# Patient Record
Sex: FEMALE | Race: WHITE
[De-identification: names, ages, dates, MRNs, and addresses within clinical notes are randomized per-mention and may not be internally consistent; named-entity substitution may affect disease eponyms.]

---

## 2020-01-20 ENCOUNTER — HOSPITAL ENCOUNTER (EMERGENCY)
Dept: HOSPITAL 4 - SED | Age: 85
LOS: 1 days | Discharge: ANOTHER HOSPITAL | End: 2020-01-21
Payer: MEDICARE

## 2020-01-20 VITALS — HEIGHT: 66 IN | WEIGHT: 92 LBS | BODY MASS INDEX: 14.79 KG/M2

## 2020-01-20 VITALS — SYSTOLIC BLOOD PRESSURE: 124 MMHG
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
## 2020-01-20 DIAGNOSIS — S72.001A: Primary | ICD-10-CM
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
## 2020-01-20 DIAGNOSIS — I48.91: ICD-10-CM

## 2020-01-20 DIAGNOSIS — F41.9: ICD-10-CM

## 2020-01-20 LAB
ALBUMIN SERPL BCP-MCNC: 2.9 G/DL (ref 3.4–4.8)
ALT SERPL W P-5'-P-CCNC: 63 U/L (ref 12–78)
ANION GAP SERPL CALCULATED.3IONS-SCNC: 5 MMOL/L (ref 5–15)
APPEARANCE UR: CLEAR
AST SERPL W P-5'-P-CCNC: 66 U/L (ref 10–37)
BASOPHILS # BLD AUTO: 0.1 K/UL (ref 0–0.2)
BASOPHILS NFR BLD AUTO: 0.8 % (ref 0–2)
BILIRUB SERPL-MCNC: 0.5 MG/DL (ref 0–1)
BILIRUB UR QL STRIP: NEGATIVE
BUN SERPL-MCNC: 21 MG/DL (ref 8–21)
CALCIUM SERPL-MCNC: 8.5 MG/DL (ref 8.4–11)
CHLORIDE SERPL-SCNC: 90 MMOL/L (ref 98–107)
COLOR UR: YELLOW
CREAT SERPL-MCNC: 0.66 MG/DL (ref 0.55–1.3)
EOSINOPHIL # BLD AUTO: 0 K/UL (ref 0–0.4)
EOSINOPHIL NFR BLD AUTO: 0.3 % (ref 0–4)
ERYTHROCYTE [DISTWIDTH] IN BLOOD BY AUTOMATED COUNT: 14.2 % (ref 9–15)
GFR SERPL CREATININE-BSD FRML MDRD: (no result) ML/MIN (ref 90–?)
GLUCOSE SERPL-MCNC: 116 MG/DL (ref 70–99)
GLUCOSE UR STRIP-MCNC: NEGATIVE MG/DL
HCT VFR BLD AUTO: 32.9 % (ref 36–48)
HGB BLD-MCNC: 11.1 G/DL (ref 12–16)
HGB UR QL STRIP: NEGATIVE
INR PPP: 1.1 (ref 0.8–1.2)
KETONES UR STRIP-MCNC: NEGATIVE MG/DL
LEUKOCYTE ESTERASE UR QL STRIP: NEGATIVE
LYMPHOCYTES # BLD AUTO: 0.3 K/UL (ref 1–5.5)
LYMPHOCYTES NFR BLD AUTO: 3.2 % (ref 20.5–51.5)
MCH RBC QN AUTO: 32 PG (ref 27–31)
MCHC RBC AUTO-ENTMCNC: 34 % (ref 32–36)
MCV RBC AUTO: 96 FL (ref 79–98)
MONOCYTES # BLD MANUAL: 1.7 K/UL (ref 0–1)
MONOCYTES # BLD MANUAL: 15.8 % (ref 1.7–9.3)
NEUTROPHILS # BLD AUTO: 8.4 K/UL (ref 1.8–7.7)
NEUTROPHILS NFR BLD AUTO: 79.9 % (ref 40–70)
NITRITE UR QL STRIP: NEGATIVE
PH UR STRIP: 6.5 [PH] (ref 5–8)
PLATELET # BLD AUTO: 261 K/UL (ref 130–430)
POTASSIUM SERPL-SCNC: 4.7 MMOL/L (ref 3.5–5.1)
PROT UR QL STRIP: NEGATIVE
PROTHROMBIN TIME: 10.7 SECS (ref 9.5–12.5)
RBC # BLD AUTO: 3.44 MIL/UL (ref 4.2–6.2)
SODIUM SERPLBLD-SCNC: 122 MMOL/L (ref 136–145)
SP GR UR STRIP: 1.01 (ref 1–1.03)
UROBILINOGEN UR STRIP-MCNC: 1 MG/DL (ref 0.2–1)
WBC # BLD AUTO: 10.5 K/UL (ref 4.8–10.8)

## 2020-01-20 PROCEDURE — 85610 PROTHROMBIN TIME: CPT

## 2020-01-20 PROCEDURE — 80053 COMPREHEN METABOLIC PANEL: CPT

## 2020-01-20 PROCEDURE — 85730 THROMBOPLASTIN TIME PARTIAL: CPT

## 2020-01-20 PROCEDURE — 81003 URINALYSIS AUTO W/O SCOPE: CPT

## 2020-01-20 PROCEDURE — 36415 COLL VENOUS BLD VENIPUNCTURE: CPT

## 2020-01-20 PROCEDURE — 99285 EMERGENCY DEPT VISIT HI MDM: CPT

## 2020-01-20 PROCEDURE — 72170 X-RAY EXAM OF PELVIS: CPT

## 2020-01-20 PROCEDURE — 96375 TX/PRO/DX INJ NEW DRUG ADDON: CPT

## 2020-01-20 PROCEDURE — 96374 THER/PROPH/DIAG INJ IV PUSH: CPT

## 2020-01-20 PROCEDURE — 93005 ELECTROCARDIOGRAM TRACING: CPT

## 2020-01-20 PROCEDURE — 85025 COMPLETE CBC W/AUTO DIFF WBC: CPT

## 2020-01-20 NOTE — NUR
Pt's family requesting to give pt's PM medications , Dr Mclean agreeable to give 
PM home  medications at this time. well tolerated.

## 2020-01-20 NOTE — NUR
16 #  FR Jauregui catheter with use of sterile technique. Immediate return of 100 
cc  urine noted.  Bedside drainage bag placed below level of bladder.  Urine 
sample collected and sent to lab.  Pt tolerated procedure .



Patient arrived with jauregui in place, changed due to standard of practice prior 
to admission.



Patient unable to toilet self.

## 2020-01-20 NOTE — NUR
Pt brought by ambulance, A&Ox4, pt presents to ER with R hip/ R leg /R foot 
pain after mechanical fall, pt states she was plugging electrical equipment and 
she tripped ,pt uses a walker, denies KO, afebrile, pt respirations are labored 
at this time , states she has hx of SOB and anxiety,HR increaing from 114 to 
142, Hx of A-MD ana luisa notified, pt placed on 2L O2 via NC, will cont to monitor.

## 2020-01-21 VITALS — SYSTOLIC BLOOD PRESSURE: 101 MMHG

## 2020-01-21 NOTE — NUR
Patient to be transferred to [facility].  Is being transferred due to 
higher level of care.  Receiving facility has accepting physician and available 
space. ER physician has signed transfer form.  Patient or responsible party has 
agreed to transfer and signed form.  Patient belongings inventoried and will be 
sent with patient.  Copy of nursing notes, lab reports, EKG, Physicians Orders 
and X-rays to be sent with patient.  Report called to DAVID Aguirre at receiving 
facility. Receiving physician is Dr. Schneider. Pt leaves in c/o of [facility] 
(AC[facility]) via stretcher in stable condition.

## 2020-01-21 NOTE — NUR
Pt resting quietly, easily awakened and denies pain or discomfort, then drifts 
back to sleep. Family members at bedside, no needs verbalized at this time.

## 2020-01-21 NOTE — NUR
Daughter states that pt is restless and tries to sit up, yet c/o pain when 
attempting to do so. Dr. Mclean notified and pt to receive Ativan.